# Patient Record
Sex: FEMALE | Race: WHITE | NOT HISPANIC OR LATINO | Employment: STUDENT | URBAN - METROPOLITAN AREA
[De-identification: names, ages, dates, MRNs, and addresses within clinical notes are randomized per-mention and may not be internally consistent; named-entity substitution may affect disease eponyms.]

---

## 2021-05-15 ENCOUNTER — HOSPITAL ENCOUNTER (OUTPATIENT)
Dept: RADIOLOGY | Facility: MEDICAL CENTER | Age: 26
End: 2021-05-15
Attending: PSYCHIATRY & NEUROLOGY
Payer: COMMERCIAL

## 2021-05-15 DIAGNOSIS — F31.89 OTHER BIPOLAR DISORDER (HCC): ICD-10-CM

## 2021-05-15 DIAGNOSIS — R25.1 TREMOR: ICD-10-CM

## 2021-05-15 DIAGNOSIS — R53.83 OTHER FATIGUE: ICD-10-CM

## 2021-05-15 DIAGNOSIS — G40.109: ICD-10-CM

## 2021-05-15 PROCEDURE — 70551 MRI BRAIN STEM W/O DYE: CPT

## 2021-05-20 ENCOUNTER — HOSPITAL ENCOUNTER (OUTPATIENT)
Dept: RADIOLOGY | Facility: MEDICAL CENTER | Age: 26
End: 2021-05-20
Attending: PHYSICIAN ASSISTANT
Payer: COMMERCIAL

## 2021-05-20 DIAGNOSIS — K21.9 GASTROESOPHAGEAL REFLUX DISEASE, UNSPECIFIED WHETHER ESOPHAGITIS PRESENT: ICD-10-CM

## 2021-05-20 DIAGNOSIS — R11.0 NAUSEA: ICD-10-CM

## 2021-05-20 PROCEDURE — 76700 US EXAM ABDOM COMPLETE: CPT

## 2021-07-20 ENCOUNTER — OFFICE VISIT (OUTPATIENT)
Dept: NEUROLOGY | Facility: MEDICAL CENTER | Age: 26
End: 2021-07-20
Attending: PSYCHIATRY & NEUROLOGY
Payer: COMMERCIAL

## 2021-07-20 VITALS
OXYGEN SATURATION: 100 % | DIASTOLIC BLOOD PRESSURE: 74 MMHG | SYSTOLIC BLOOD PRESSURE: 118 MMHG | HEIGHT: 67 IN | TEMPERATURE: 98.7 F | RESPIRATION RATE: 14 BRPM | HEART RATE: 127 BPM

## 2021-07-20 DIAGNOSIS — G25.0 ESSENTIAL TREMOR: ICD-10-CM

## 2021-07-20 PROCEDURE — 99212 OFFICE O/P EST SF 10 MIN: CPT | Performed by: PSYCHIATRY & NEUROLOGY

## 2021-07-20 PROCEDURE — 99204 OFFICE O/P NEW MOD 45 MIN: CPT | Performed by: PSYCHIATRY & NEUROLOGY

## 2021-07-20 RX ORDER — ZOLPIDEM TARTRATE 12.5 MG/1
12.5 TABLET, FILM COATED, EXTENDED RELEASE ORAL DAILY
COMMUNITY
Start: 2021-07-19

## 2021-07-20 RX ORDER — LAMOTRIGINE 150 MG/1
150 TABLET ORAL 2 TIMES DAILY
COMMUNITY
Start: 2021-07-18

## 2021-07-20 RX ORDER — ROPINIROLE 0.5 MG/1
0.5 TABLET, FILM COATED ORAL 3 TIMES DAILY
COMMUNITY
Start: 2021-06-04 | End: 2023-02-10

## 2021-07-20 RX ORDER — METHOCARBAMOL 750 MG/1
750 TABLET, FILM COATED ORAL 2 TIMES DAILY
COMMUNITY
Start: 2021-07-12

## 2021-07-20 ASSESSMENT — PATIENT HEALTH QUESTIONNAIRE - PHQ9: CLINICAL INTERPRETATION OF PHQ2 SCORE: 0

## 2021-07-20 NOTE — PROGRESS NOTES
Chief Complaint   Patient presents with   • New Patient     Tremors       History of present illness:  Raymond Bowman 26 y.o. female with bipolar disorder on lamotrigine referred for upper extremity tremor.     Zonisamide was used for tremors in the past but she stopped this due to lack of efficacy.   Tremors have been going on for 2 years affecting both arms and occurs mainly with activities. She noticed it most with pinching motions.   She has intermittent full body internal tremor that is worse when she is laying down. Her balance is not affected.   The hand tremor or the full body tremor is not debilitating.   His mother and maternal grandfather both have Parkinson's disease.   Her right arm does not swing well and she is concerned that she may have Parkinson's disease.     Centrally active medications:    Lamotrigine 150mg BID  Ropinirole 0.5mg TID. This does not provide any significant benefit in the tremor.      Past medical history:   No past medical history on file.    Past surgical history:   No past surgical history on file.    Family history:   No family history on file.    Social history:   Social History     Socioeconomic History   • Marital status: Single     Spouse name: Not on file   • Number of children: Not on file   • Years of education: Not on file   • Highest education level: Not on file   Occupational History   • Not on file   Tobacco Use   • Smoking status: Never Smoker   • Smokeless tobacco: Never Used   Vaping Use   • Vaping Use: Never used   Substance and Sexual Activity   • Alcohol use: Not on file   • Drug use: Not on file   • Sexual activity: Not on file   Other Topics Concern   • Not on file   Social History Narrative   • Not on file     Social Determinants of Health     Financial Resource Strain:    • Difficulty of Paying Living Expenses:    Food Insecurity:    • Worried About Running Out of Food in the Last Year:    • Ran Out of Food in the Last Year:    Transportation Needs:   "  • Lack of Transportation (Medical):    • Lack of Transportation (Non-Medical):    Physical Activity:    • Days of Exercise per Week:    • Minutes of Exercise per Session:    Stress:    • Feeling of Stress :    Social Connections:    • Frequency of Communication with Friends and Family:    • Frequency of Social Gatherings with Friends and Family:    • Attends [employer] Services:    • Active Member of Clubs or Organizations:    • Attends Club or Organization Meetings:    • Marital Status:    Intimate Partner Violence:    • Fear of Current or Ex-Partner:    • Emotionally Abused:    • Physically Abused:    • Sexually Abused:        Current medications:   Current Outpatient Medications   Medication   • lamotrigine (LAMICTAL) 150 MG tablet   • methocarbamol (ROBAXIN) 750 MG Tab   • ROPINIRole (REQUIP) 0.5 MG Tab   • zolpidem (AMBIEN CR) 12.5 MG CR tablet   • meloxicam (MOBIC) 15 MG tablet   • tizanidine (ZANAFLEX) 2 MG tablet     No current facility-administered medications for this visit.       Medication Allergy:  Not on File    Physical examination:   Vitals:    07/20/21 0848   BP: 118/74   BP Location: Left arm   Patient Position: Sitting   BP Cuff Size: Adult   Pulse: (!) 127   Resp: 14   Temp: 37.1 °C (98.7 °F)   TempSrc: Temporal   SpO2: 100%   Height: 1.702 m (5' 7\")     Neurological Exam  Mental Status  Awake and alert. Speech is normal. Language is fluent with no aphasia.    Motor   Normal muscle tone. The following abnormal movements were seen: Mild bilateral upper extremity postural/action tremor.   No rest tremor. .  Normal speed and amplitude of rapid finger tapping.   .    Coordination  Right: Finger-to-nose normal. Rapid alternating movement normal.  Left: Finger-to-nose normal. Rapid alternating movement normal.    Gait  Casual gait is normal including stance, stride, and arm swing.  Decreased right arm swing. .      Labs:  None    Imaging:   None     ASSESSMENT AND PLAN:  Problem List Items Addressed " This Visit     Essential tremor          1. Essential tremor    Other orders  - lamotrigine (LAMICTAL) 150 MG tablet; Take 150 mg by mouth 2 times a day.  - methocarbamol (ROBAXIN) 750 MG Tab; Take 750 mg by mouth 2 times a day. 2 to 3 times daily per patient  - ROPINIRole (REQUIP) 0.5 MG Tab; Take 0.5 mg by mouth in the morning, at noon, and at bedtime.  - zolpidem (AMBIEN CR) 12.5 MG CR tablet; Take 12.5 mg by mouth every day.    26 year old male with slight bilateral postural/action tremor. I have counseled him that this is most consistent with essential tremor. Lamotrigine may be contributing to his tremor as well.   He is concerned that he may have PD given the family history of PD however aside from decreased right arm swing, he does not have signs on exam suggestive of this diagnosis.   I have provided instructions for him to wean off of ropinirole.     FOLLOW-UP:   Return if symptoms worsen or fail to improve.    Total time spent for the day for this patient unrelated to procedure time is: 25 minutes. I spent 20 minutes in face to face time and I spent 4 minutes pre-charting and 1 minutes in post-visit documentation.      Dr. Harris Valdez D.O.  [facility] Neurology   Movement Disorders Specialist

## 2021-07-20 NOTE — PATIENT INSTRUCTIONS
Wean off of ropinirole:     Week 1: 1 tab twice daily   Week 2: 1 tab daily   Week 3: Stop ropinirole.

## 2023-02-10 ENCOUNTER — OFFICE VISIT (OUTPATIENT)
Dept: CARDIOLOGY | Facility: MEDICAL CENTER | Age: 28
End: 2023-02-10
Payer: COMMERCIAL

## 2023-02-10 VITALS
RESPIRATION RATE: 18 BRPM | HEIGHT: 67 IN | OXYGEN SATURATION: 97 % | HEART RATE: 128 BPM | DIASTOLIC BLOOD PRESSURE: 100 MMHG | SYSTOLIC BLOOD PRESSURE: 144 MMHG

## 2023-02-10 DIAGNOSIS — R42 EPISODIC LIGHTHEADEDNESS: ICD-10-CM

## 2023-02-10 DIAGNOSIS — I10 HYPERTENSION, UNSPECIFIED TYPE: ICD-10-CM

## 2023-02-10 DIAGNOSIS — R00.0 TACHYCARDIA: ICD-10-CM

## 2023-02-10 DIAGNOSIS — E78.2 MIXED HYPERLIPIDEMIA: ICD-10-CM

## 2023-02-10 LAB — EKG IMPRESSION: NORMAL

## 2023-02-10 PROCEDURE — 93000 ELECTROCARDIOGRAM COMPLETE: CPT | Performed by: INTERNAL MEDICINE

## 2023-02-10 PROCEDURE — 99204 OFFICE O/P NEW MOD 45 MIN: CPT | Performed by: INTERNAL MEDICINE

## 2023-02-10 RX ORDER — ACETAMINOPHEN 500 MG
500-1000 TABLET ORAL EVERY 6 HOURS PRN
COMMUNITY

## 2023-02-10 RX ORDER — DIPHENHYDRAMINE HCL 25 MG
25 TABLET ORAL EVERY 6 HOURS PRN
COMMUNITY

## 2023-02-10 RX ORDER — TESTOSTERONE CYPIONATE 200 MG/ML
INJECTION, SOLUTION INTRAMUSCULAR
COMMUNITY
Start: 2022-12-24

## 2023-02-10 RX ORDER — CELECOXIB 100 MG/1
CAPSULE ORAL
COMMUNITY
Start: 2023-01-21 | End: 2023-07-14

## 2023-02-10 RX ORDER — CLONAZEPAM 0.5 MG/1
TABLET ORAL
COMMUNITY
Start: 2023-01-25

## 2023-02-10 RX ORDER — PRAVASTATIN SODIUM 10 MG
10 TABLET ORAL
COMMUNITY
Start: 2023-01-24

## 2023-02-10 RX ORDER — AMLODIPINE BESYLATE 2.5 MG/1
2.5 TABLET ORAL DAILY
Qty: 90 TABLET | Refills: 0 | Status: SHIPPED | OUTPATIENT
Start: 2023-02-10 | End: 2023-05-15

## 2023-02-10 RX ORDER — BACLOFEN 10 MG/1
TABLET ORAL
COMMUNITY
Start: 2023-01-24

## 2023-02-10 NOTE — PROGRESS NOTES
Cardiology Initial Consultation Note    Date of note:    2/10/2023    Primary Care Provider: BURAK Espinoza  Referring Provider: Yamilex Corrales,*     Patient Name: Raymond Bowman   YOB: 1995  MRN:              [id number]    Chief Complaint   Patient presents with    Tachycardia     NP Dx: Tachycardia, unspecified    Hypertension     NP Dx: Elevated blood-pressure reading, without diagnosis of hypertension       History of Present Illness: Raymond Bowman is a 27 y.o. transgender man with a past medical history significant for mixed hyperlipidemia, chronic back pain, bipolar disorder, essential tremors, elevated blood pressure (not currently on antihypertensive medications) who presents to the cardiovascular office for further evaluation of lightheadedness/dizziness and tachycardia.    Patient states that symptoms have been been going on for the past year.  Generally experiences lightheadedness and dizziness with abrupt movements including standing from seated position.  Also has associated shortness of breath with these episodes.  Denies having chest pain or episodes of syncope. Patient saw his PCP regarding this back in Dec 2022 and there was concern for possible POTS. Today, patient generally feels okay when at rest.  However has noticed ongoing symptoms of lightheadedness with ambulation after PCP visit.  The symptoms can last several minutes to hours.     From a social history is negative for EtOH use, tobacco use, recreational drug use.    Cardiovascular Risk Factors:  1. Smoking status: Denies  2. Type II Diabetes Mellitus: Denies No results found for: HBA1C  3. Hypertension: Yes, not on medications  4. Dyslipidemia: yes, on statin therapy No results found for: CHOLSTRLTOT, LDL, HDL, TRIGLYCERIDE  5. Family history of early Coronary Artery Disease in a first degree relative (Male less than 55 years of age; Female less than 65 years of age): Denies      Review of  Systems:  As per HPI. All other systems reviewed and are negative.      History reviewed. No pertinent past medical history.      History reviewed. No pertinent surgical history.      Current Outpatient Medications   Medication Sig Dispense Refill    baclofen (LIORESAL) 10 MG Tab PLEASE SEE ATTACHED FOR DETAILED DIRECTIONS      celecoxib (CELEBREX) 100 MG Cap TAKE ONE CAPSULE BY MOUTH ONCE DAILY AS NEEDED FOR BACK PAIN. TAKE WITH FOOD      clonazePAM (KLONOPIN) 0.5 MG Tab TAKE 1 TABLET BY MOUTH DAILY AS NEEDED FOR ANXIETY      pravastatin (PRAVACHOL) 10 MG Tab Take 10 mg by mouth every day.      testosterone cypionate (DEPO-TESTOSTERONE) 200 MG/ML Solution injection INJECT 0.5 ML INTO MUSCLE EVERY 7 DAYS      acetaminophen (TYLENOL) 500 MG Tab Take 500-1,000 mg by mouth every 6 hours as needed.      diphenhydrAMINE (BENADRYL) 25 MG Tab Take 25 mg by mouth every 6 hours as needed for Sleep. 4 pills at night      amLODIPine (NORVASC) 2.5 MG Tab Take 1 Tablet by mouth every day. 90 Tablet 0    lamotrigine (LAMICTAL) 150 MG tablet Take 150 mg by mouth 2 times a day.      methocarbamol (ROBAXIN) 750 MG Tab Take 750 mg by mouth 2 times a day. 2 to 3 times daily per patient      zolpidem (AMBIEN CR) 12.5 MG CR tablet Take 12.5 mg by mouth every day.       No current facility-administered medications for this visit.       No Known Allergies      History reviewed. No pertinent family history.      Social History     Socioeconomic History    Marital status: Single     Spouse name: Not on file    Number of children: Not on file    Years of education: Not on file    Highest education level: Not on file   Occupational History    Not on file   Tobacco Use    Smoking status: Never    Smokeless tobacco: Never   Vaping Use    Vaping Use: Never used   Substance and Sexual Activity    Alcohol use: Never    Drug use: Never    Sexual activity: Not on file   Other Topics Concern    Not on file   Social History Narrative    Not on file  "    Social Determinants of Health     Financial Resource Strain: Not on file   Food Insecurity: Not on file   Transportation Needs: Not on file   Physical Activity: Not on file   Stress: Not on file   Social Connections: Not on file   Intimate Partner Violence: Not on file   Housing Stability: Not on file         Physical Exam:  Ambulatory Vitals  BP (!) 144/100 (BP Location: Left arm, Patient Position: Standing)   Pulse (!) 128   Resp 18   Ht 1.702 m (5' 7\")   SpO2 97%    Oxygen Therapy:  Pulse Oximetry: 97 %  BP Readings from Last 4 Encounters:   02/10/23 (!) 144/100   07/20/21 118/74     Orthostatic vital signs:  Sitting: /100, pulse 120 bpm  Standing: /100, pulse 128 bpm    Weight/BMI: There is no height or weight on file to calculate BMI.  Wt Readings from Last 4 Encounters:   No data found for Wt       General: Not in acute distress, sitting comfortably in chair  EYES: No jaundice  HEENT: OP clear   Neck:  No carotid bruits, No JVD appreciated  CVS: Tachycardic, regular rhythm, normal S1, S2. No murmurs, rubs or gallops  Resp: Normal respiratory effort, lungs CTA bilaterally. No rales or rhonchi  Abdomen: Soft, non-distended, non-tender to palpation, no guarding or rigidity  Skin: No obvious rashes, no cyanosis  Neurological: Alert and oriented x3, moves all extremities, no focal neurologic deficits  Psychiatric: Appropriate affect  Extremities:   Extremities warm, 2+ bilateral radial pulses.  2+ bilateral dp pulses, no lower extremity edema bilaterally      Lab Data Review:  No results found for: CHOLSTRLTOT, LDL, HDL, TRIGLYCERIDE    No results found for: SODIUM, POTASSIUM, CHLORIDE, CO2, GLUCOSE, BUN, CREATININE, BUNCREATRAT, GLOMRATE  No results found for: ALKPHOSPHAT, ASTSGOT, ALTSGPT, TBILIRUBIN   No results found for: WBC  No results found for: HBA1C      Cardiac Imaging and Procedures Review:    EKG dated 2/10/2023: My personal interpretation is sinus tachycardia with rate of 118 bpm, " early repolarization changes, right axis deviation      Assessment & Plan     1. Hypertension, unspecified type  EKG    EC-ECHOCARDIOGRAM COMPLETE W/O CONT      2. Episodic lightheadedness  TSH WITH REFLEX TO FT4    Cardiac Event Monitor    EC-ECHOCARDIOGRAM COMPLETE W/O CONT      3. Tachycardia  TSH WITH REFLEX TO FT4    Cardiac Event Monitor    EC-ECHOCARDIOGRAM COMPLETE W/O CONT      4. Mixed hyperlipidemia              Medical Decision Making:  Raymond Bowman is a 27 y.o. transgender man with a past medical history significant for mixed hyperlipidemia, chronic back pain, bipolar disorder, essential tremors, elevated blood pressure (not currently on antihypertensive medications) who presents to the cardiovascular office for further evaluation of lightheadedness/dizziness and tachycardia.    1. Hypertension, unspecified type  - Noted to have elevated blood pressure in office today with BP of 150/100.  According to the patient, has had multiple elevated readings in the past during prior office visits with physicians.  BP was repeated and remained persistently elevated.  At this time, patient would benefit from starting BP medication.  - Start low dose amlodipine 2.5mg for now given baseline symptoms of lightheadedness/dizziness and concern that over treating BP initially may confound picture. We will uptitrate as tolerated. Advised patient to purchase BP cuff and monitor/maintain BP log.    2. Episodic lightheadedness  3. Tachycardia  - Patient's persistent tachycardia and presyncopal symptoms appear to be a chronic issue over the past year. Etiology of the sinus tachycardia is unclear but can be due to multiple underlying reasons. There could be a component of polypharmacy given the various concurrent medications including Klonopin, baclofen, diphenhydramine, Lamictal and Robaxin use. Although POTS is a possibility, this appears to be less likely given resting/baseline tachycardia. Also, HR with standing only  increased from 120 bpm to 128 bpm. Clinical picture may represent inappropriate sinus tachycardia which is a diagnosis of exclusion.  Patient will need initial cardiac work-up to rule out other causes.  We will check thyroid function test to ensure patient is euthyroid.  Check cardiac monitor as well as transthoracic echocardiogram to evaluate for structural abnormalities.    4. Mixed hyperlipidemia  - Likely related to hormone therapy with testosterone.  Currently on statin therapy for lipid derangements.  Will eventually need repeat lipid panel in the next 6 months.      Other orders  - baclofen (LIORESAL) 10 MG Tab; PLEASE SEE ATTACHED FOR DETAILED DIRECTIONS  - celecoxib (CELEBREX) 100 MG Cap; TAKE ONE CAPSULE BY MOUTH ONCE DAILY AS NEEDED FOR BACK PAIN. TAKE WITH FOOD  - clonazePAM (KLONOPIN) 0.5 MG Tab; TAKE 1 TABLET BY MOUTH DAILY AS NEEDED FOR ANXIETY  - pravastatin (PRAVACHOL) 10 MG Tab; Take 10 mg by mouth every day.  - testosterone cypionate (DEPO-TESTOSTERONE) 200 MG/ML Solution injection; INJECT 0.5 ML INTO MUSCLE EVERY 7 DAYS  - acetaminophen (TYLENOL) 500 MG Tab; Take 500-1,000 mg by mouth every 6 hours as needed.  - diphenhydrAMINE (BENADRYL) 25 MG Tab; Take 25 mg by mouth every 6 hours as needed for Sleep. 4 pills at night  - amLODIPine (NORVASC) 2.5 MG Tab; Take 1 Tablet by mouth every day.  Dispense: 90 Tablet; Refill: 0      A total of 48 minutes of time was spent on day of encounter reviewing medical record, performing history and examination, counseling, ordering medication/test/consults and documentation.    It was a pleasure seeing Ms. Raymond Bowman in the office today. Return in about 2 months (around 4/10/2023). Patient is aware to call the cardiology clinic with any questions or concerns.      Jamar Aragon MD  Cardiologist, [facility]  [facility] [address]  [address] [place] [address]  Phone: [phone]  Fax:  [phone]    Please note that this dictation was created using voice recognition software. I have made every reasonable attempt to correct obvious errors, but it is possible there are errors of grammar and possibly content that I did not discover before finalizing the note.

## 2023-02-17 ENCOUNTER — NON-PROVIDER VISIT (OUTPATIENT)
Dept: CARDIOLOGY | Facility: MEDICAL CENTER | Age: 28
End: 2023-02-17
Attending: INTERNAL MEDICINE
Payer: COMMERCIAL

## 2023-02-17 DIAGNOSIS — R00.0 TACHYCARDIA: ICD-10-CM

## 2023-02-17 DIAGNOSIS — R42 EPISODIC LIGHTHEADEDNESS: ICD-10-CM

## 2023-02-17 DIAGNOSIS — I49.1 APC (ATRIAL PREMATURE CONTRACTIONS): ICD-10-CM

## 2023-02-17 DIAGNOSIS — I47.10 SVT (SUPRAVENTRICULAR TACHYCARDIA) (HCC): ICD-10-CM

## 2023-02-17 DIAGNOSIS — I49.3 PVC'S (PREMATURE VENTRICULAR CONTRACTIONS): ICD-10-CM

## 2023-02-17 NOTE — PROGRESS NOTES
Home enrollment completed in the 14 day [employer] Holter monitoring program, per Jamar Aragon M.D.  Monitor will be shipped to patient via [employer].  >Pending EOS.

## 2023-02-24 ENCOUNTER — HOSPITAL ENCOUNTER (OUTPATIENT)
Dept: CARDIOLOGY | Facility: MEDICAL CENTER | Age: 28
End: 2023-02-24
Attending: INTERNAL MEDICINE
Payer: COMMERCIAL

## 2023-02-24 DIAGNOSIS — R00.0 TACHYCARDIA: ICD-10-CM

## 2023-02-24 DIAGNOSIS — I10 HYPERTENSION, UNSPECIFIED TYPE: ICD-10-CM

## 2023-02-24 DIAGNOSIS — R42 EPISODIC LIGHTHEADEDNESS: ICD-10-CM

## 2023-02-24 PROCEDURE — 93306 TTE W/DOPPLER COMPLETE: CPT

## 2023-02-27 LAB
LV EJECT FRACT  99904: 65
LV EJECT FRACT MOD 2C 99903: 58.13
LV EJECT FRACT MOD 4C 99902: 47.48
LV EJECT FRACT MOD BP 99901: 53.75

## 2023-02-27 PROCEDURE — 93306 TTE W/DOPPLER COMPLETE: CPT | Mod: 26 | Performed by: INTERNAL MEDICINE

## 2023-03-20 ENCOUNTER — TELEPHONE (OUTPATIENT)
Dept: CARDIOLOGY | Facility: MEDICAL CENTER | Age: 28
End: 2023-03-20
Payer: COMMERCIAL

## 2023-03-21 PROCEDURE — 93248 EXT ECG>7D<15D REV&INTERPJ: CPT | Performed by: INTERNAL MEDICINE

## 2023-05-15 RX ORDER — AMLODIPINE BESYLATE 2.5 MG/1
2.5 TABLET ORAL DAILY
Qty: 90 TABLET | Refills: 2 | Status: SHIPPED | OUTPATIENT
Start: 2023-05-15 | End: 2023-07-14

## 2023-05-15 NOTE — TELEPHONE ENCOUNTER
Is the patient due for a refill? Yes    Was the patient seen the past year? Yes    Date of last office visit: 2/10/2023    Does the patient have an upcoming appointment?  No   If yes, When?     Provider to refill:GAVIN    Does the patients insurance require a 100 day supply?  No

## 2023-07-10 ENCOUNTER — TELEPHONE (OUTPATIENT)
Dept: CARDIOLOGY | Facility: MEDICAL CENTER | Age: 28
End: 2023-07-10
Payer: COMMERCIAL

## 2023-07-10 NOTE — TELEPHONE ENCOUNTER
Called pt in regards to lab work that was ordered at previous OV. LVM for call back to see if the labs were completed somewhere outside of [facility]. Pt has follow up appointment scheduled with GAVIN on 07/14/23.

## 2023-07-14 ENCOUNTER — OFFICE VISIT (OUTPATIENT)
Dept: CARDIOLOGY | Facility: MEDICAL CENTER | Age: 28
End: 2023-07-14
Attending: INTERNAL MEDICINE
Payer: COMMERCIAL

## 2023-07-14 VITALS
HEIGHT: 67 IN | SYSTOLIC BLOOD PRESSURE: 110 MMHG | OXYGEN SATURATION: 97 % | HEART RATE: 126 BPM | DIASTOLIC BLOOD PRESSURE: 90 MMHG

## 2023-07-14 DIAGNOSIS — R42 EPISODIC LIGHTHEADEDNESS: ICD-10-CM

## 2023-07-14 DIAGNOSIS — R06.02 SHORTNESS OF BREATH: ICD-10-CM

## 2023-07-14 DIAGNOSIS — R60.0 BILATERAL LOWER EXTREMITY EDEMA: ICD-10-CM

## 2023-07-14 DIAGNOSIS — E78.2 MIXED HYPERLIPIDEMIA: ICD-10-CM

## 2023-07-14 DIAGNOSIS — E03.8 OTHER SPECIFIED HYPOTHYROIDISM: ICD-10-CM

## 2023-07-14 PROBLEM — R53.83 FATIGUE: Status: ACTIVE | Noted: 2023-07-14

## 2023-07-14 PROCEDURE — 99214 OFFICE O/P EST MOD 30 MIN: CPT | Performed by: INTERNAL MEDICINE

## 2023-07-14 PROCEDURE — 3074F SYST BP LT 130 MM HG: CPT | Performed by: INTERNAL MEDICINE

## 2023-07-14 PROCEDURE — 3080F DIAST BP >= 90 MM HG: CPT | Performed by: INTERNAL MEDICINE

## 2023-07-14 PROCEDURE — 99212 OFFICE O/P EST SF 10 MIN: CPT | Performed by: INTERNAL MEDICINE

## 2023-07-14 ASSESSMENT — ENCOUNTER SYMPTOMS
LOSS OF CONSCIOUSNESS: 0
ORTHOPNEA: 0
SHORTNESS OF BREATH: 1
MYALGIAS: 0
PND: 0
PALPITATIONS: 0
COUGH: 0
DIZZINESS: 1

## 2023-07-15 NOTE — PROGRESS NOTES
Cardiology Initial Consultation Note    Date of note:    7/14/2023    Primary Care Provider: BURAK Espinoza  Referring Provider: [facility]*    Patient Name: Raymond Bowman   YOB: 1995  MRN:              [id number]    Chief Complaint   Patient presents with    Hypertension       History of Present Illness: Raymond Bowman is a 28 y.o. transgender man with a past medical history significant for mixed hyperlipidemia, chronic back pain, bipolar disorder, essential tremors, elevated blood pressure (not currently on antihypertensive medications) who presents to the cardiovascular office for followup    Patient was initially seen in the cardiology office for further evaluation of lightheadedness and dizziness associated with dyspnea.  During that visit additional cardiac studies were performed including echocardiogram which showed normal LV systolic function and no major structural abnormalities.  He had a Zio patch to rule out any underlying arrhythmias.  He was noted to have predominantly sinus rhythm with an average heart rate of 98 bpm.  There were rare PVCs and rare PACs.  Also noted to have 1 episode of SVT that was nonsustained and lasting only 8 beats in duration.  He had thyroid function tests checked and was noted to have elevated TSH of approximately 7.0.  Because of this, his primary care physician started him on low-dose of thyroid replacement hormone.    Since his last visit, he has noticed swelling in his lower extremities.  Initially thought it was due to the amlodipine that he was taking and so he discontinued this medication.  Despite discontinuation, he still notices having some swelling in his legs predominantly involving the right lower extremity.  In addition, he continues to experience dyspnea/shortness of breath associated with lightheadedness and dizziness.  No episodes of syncope.  He denies having chest pain, palpitations, orthopnea or  PND.      Cardiovascular Risk Factors:  1. Smoking status: Denies  2. Type II Diabetes Mellitus: Denies No results found for: HBA1C  3. Hypertension: Yes, not on medications  4. Dyslipidemia: yes, on statin therapy No results found for: CHOLSTRLTOT, LDL, HDL, TRIGLYCERIDE  5. Family history of early Coronary Artery Disease in a first degree relative (Male less than 55 years of age; Female less than 65 years of age): Denies      Review of Systems:  Review of Systems   Respiratory:  Positive for shortness of breath. Negative for cough.    Cardiovascular:  Positive for leg swelling. Negative for chest pain, palpitations, orthopnea and PND.   Musculoskeletal:  Negative for joint pain and myalgias.   Neurological:  Positive for dizziness. Negative for loss of consciousness.       History reviewed. No pertinent past medical history.      History reviewed. No pertinent surgical history.      Current Outpatient Medications   Medication Sig Dispense Refill    clonazePAM (KLONOPIN) 0.5 MG Tab TAKE 1 TABLET BY MOUTH DAILY AS NEEDED FOR ANXIETY      pravastatin (PRAVACHOL) 10 MG Tab Take 10 mg by mouth every day.      testosterone cypionate (DEPO-TESTOSTERONE) 200 MG/ML Solution injection INJECT 0.5 ML INTO MUSCLE EVERY 7 DAYS      acetaminophen (TYLENOL) 500 MG Tab Take 500-1,000 mg by mouth every 6 hours as needed.      diphenhydrAMINE (BENADRYL) 25 MG Tab Take 25 mg by mouth every 6 hours as needed for Sleep. 4 pills at night      lamotrigine (LAMICTAL) 150 MG tablet Take 150 mg by mouth 2 times a day.      methocarbamol (ROBAXIN) 750 MG Tab Take 750 mg by mouth 2 times a day. 2 to 3 times daily per patient      zolpidem (AMBIEN CR) 12.5 MG CR tablet Take 12.5 mg by mouth every day.      baclofen (LIORESAL) 10 MG Tab PLEASE SEE ATTACHED FOR DETAILED DIRECTIONS       No current facility-administered medications for this visit.         No Known Allergies      History reviewed. No pertinent family history.      Social History  "    Socioeconomic History    Marital status: Single     Spouse name: Not on file    Number of children: Not on file    Years of education: Not on file    Highest education level: Not on file   Occupational History    Not on file   Tobacco Use    Smoking status: Never    Smokeless tobacco: Never   Vaping Use    Vaping Use: Never used   Substance and Sexual Activity    Alcohol use: Never    Drug use: Never    Sexual activity: Not on file   Other Topics Concern    Not on file   Social History Narrative    Not on file     Social Determinants of Health     Financial Resource Strain: Not on file   Food Insecurity: Not on file   Transportation Needs: Not on file   Physical Activity: Not on file   Stress: Not on file   Social Connections: Not on file   Intimate Partner Violence: Not on file   Housing Stability: Not on file         Physical Exam:  Ambulatory Vitals  BP (!) 110/90 (BP Location: Left arm, Patient Position: Sitting, BP Cuff Size: Adult)   Pulse (!) 126   Ht 1.702 m (5' 7\")   SpO2 97%    Oxygen Therapy:  Pulse Oximetry: 97 %  BP Readings from Last 4 Encounters:   07/14/23 (!) 110/90   02/10/23 (!) 144/100   07/20/21 118/74       Weight/BMI: There is no height or weight on file to calculate BMI.  Wt Readings from Last 4 Encounters:   No data found for Wt         General: Not in acute distress, appears comfortable  HEENT: OP clear   Neck:  No carotid bruits, No JVD appreciated  CVS:  RRR, Normal S1, S2. No murmurs, rubs or gallops  Resp: Normal respiratory effort, lungs CTA bilaterally. No rales or rhonchi  Abdomen: Soft, non-distended, non-tender to palpation, no guarding or rigidity  Skin: No obvious rashes, no cyanosis  Neurological: Alert and oriented x3, moves all extremities, no focal neurologic deficits  Psychiatric: Appropriate affect  Extremities:   Extremities warm, pulses intact, 1+ edema R>L      Lab Data Review:  No results found for: CHOLSTRLTOT, LDL, HDL, TRIGLYCERIDE    No results found for: " SODIUM, POTASSIUM, CHLORIDE, CO2, GLUCOSE, BUN, CREATININE, BUNCREATRAT, GLOMRATE  No results found for: ALKPHOSPHAT, ASTSGOT, ALTSGPT, TBILIRUBIN   No results found for: WBC  No results found for: HBA1C      Cardiac Imaging and Procedures Review:    EKG dated 2/10/2023: My personal interpretation is sinus tachycardia with rate of 118 bpm, early repolarization changes, right axis deviation    Echo 2/24/23  Normal left ventricular systolic function. The left ventricular   ejection fraction is visually estimated to be 65%.   Normal right ventricular size and systolic function.  No significant valvular abnormalities.     Ziopatch 3/21/23:  Predominant rhythm is sinus rhythm with an average heart rate of 98 bpm.   There were no episodes of ventricular tachycardia.   There were no episodes of atrial fibrillation.   There were no significant pauses or high-grade AV block.   There was 1 episode of supraventricular tachycardia, lasting 8 beats in duration.   Rare premature ventricular complexes, PVCs, 1.0%   Rare premature supraventricular complexes, PACs, 1.0%   Patient triggered events correlated with sinus rhythm.         Assessment & Plan     1. Episodic lightheadedness        2. Mixed hyperlipidemia        3. Other specified hypothyroidism        4. Bilateral lower extremity edema  US-EXTREMITY VENOUS LOWER BILAT    CT-CTA CHEST PULMONARY ARTERY W/ RECONS      5. Shortness of breath  CT-CTA CHEST PULMONARY ARTERY W/ RECONS            Medical Decision Making:  Raymond Bowman is a 28 y.o. transgender man with a past medical history significant for mixed hyperlipidemia, chronic back pain, bipolar disorder, essential tremors, elevated blood pressure (not currently on antihypertensive medications) who presents to the cardiovascular office for followup    1. Episodic lightheadedness  -Etiology of his episodic lightheadedness is unclear.  Cardiac work-up including echocardiogram and Zio patch does not show any obvious cardiac  etiology.  His echocardiogram shows normal LV systolic function and no major valvular abnormalities.  In addition, Zio patch did not show any sustained arrhythmias that would explain his symptoms.  I recommended that he follow-up with his PCP to be evaluated for other noncardiac causes of lightheadedness.    2. Mixed hyperlipidemia  Lipids currently being controlled on statin therapy.  Continue current dose of pravastatin    3. Other specified hypothyroidism  New diagnosis hypothyroidism.  Initiated on therapy with levothyroxine as per PCP.    4. Bilateral lower extremity edema  5. Shortness of breath  -His history of lower extremity edema concerning for possible thrombosis.  He endorses still having swelling in his lower extremities despite discontinuation of amlodipine.  As such, he should be worked up for DVT.  We will arrange for lower extremity venous duplex as well as CTA PE study to rule out pulmonary embolism given his symptoms of dyspnea.  If studies are positive for DVT or VTE, will need to be started on anticoagulation.    It was a pleasure seeing Ms. Raymond Bowman in the office today. Return if symptoms worsen or fail to improve. Patient is aware to call the cardiology clinic with any questions or concerns.      Jamar Aragon MD, [facility]  Cardiologist, [facility], [facility]  [address] [address]  Phone: [phone]  Fax: [phone]    Please note that this dictation was created using voice recognition software. I have made every reasonable attempt to correct obvious errors, but it is possible there are errors of grammar and possibly content that I did not discover before finalizing the note.

## 2023-08-15 ENCOUNTER — HOSPITAL ENCOUNTER (OUTPATIENT)
Dept: RADIOLOGY | Facility: MEDICAL CENTER | Age: 28
End: 2023-08-15
Attending: INTERNAL MEDICINE
Payer: COMMERCIAL

## 2023-08-15 DIAGNOSIS — R60.0 BILATERAL LOWER EXTREMITY EDEMA: ICD-10-CM

## 2023-08-15 PROCEDURE — 93970 EXTREMITY STUDY: CPT
